# Patient Record
Sex: FEMALE | Race: WHITE | NOT HISPANIC OR LATINO | URBAN - METROPOLITAN AREA
[De-identification: names, ages, dates, MRNs, and addresses within clinical notes are randomized per-mention and may not be internally consistent; named-entity substitution may affect disease eponyms.]

---

## 2019-07-22 ENCOUNTER — TELEPHONE (OUTPATIENT)
Dept: NEUROLOGY | Facility: CLINIC | Age: 39
End: 2019-07-22

## 2019-07-22 DIAGNOSIS — Z82.49 FAMILY HISTORY OF CEREBRAL ANEURYSM: Primary | ICD-10-CM

## 2019-07-22 NOTE — TELEPHONE ENCOUNTER
Spoke with patients sister, Gabby, who reports patient would like to have imaging to screen for aneurysm as her brother (Dr. Hussein's patient) has incidental giant PCom aneurysm, and patients grandparents both  of ruptured aneurysm.     Will get orders from Dr. Hussein and have scheduling reach out to arrange. Gabby verbalized understanding.

## 2019-07-24 NOTE — TELEPHONE ENCOUNTER
Order harry Pierre informed. She will call to schedule and call RN back after completed. Will have Dr. Hussein review and follow-up via phone.

## 2019-08-02 ENCOUNTER — ANCILLARY PROCEDURE (OUTPATIENT)
Dept: MRI IMAGING | Facility: CLINIC | Age: 39
End: 2019-08-02
Attending: NEUROLOGICAL SURGERY
Payer: COMMERCIAL

## 2019-08-02 DIAGNOSIS — Z82.49 FAMILY HISTORY OF CEREBRAL ANEURYSM: ICD-10-CM

## 2019-08-20 ENCOUNTER — TELEPHONE (OUTPATIENT)
Dept: NEUROLOGY | Facility: CLINIC | Age: 39
End: 2019-08-20

## 2019-08-20 NOTE — TELEPHONE ENCOUNTER
MRA Brain 8/2/19 completed for family history    Impression:    1. No definite aneurysm or stenosis of the major intracranial  Arteries.    Sister, Gabby, informed. Will have Dr. Hussein review as well.     Message sent to Dr. Hussein to review. Tali Patterson RN 8/26/2019 10:21 AM

## 2019-08-28 NOTE — TELEPHONE ENCOUNTER
"Per Dr. Hussein - \"I agree with the report, no aneurysms. If she has low risk factors (no smoking, hypertension is controlled, etc), I would repeat an MRA in 10 years. If she's got risk factors and isn't going to control them, then MRA in 5 years.\"    Spoke with patients sister, Gabby. Patient is an active smoker - she just started again. No hypertension, DM, hyperlipidemia,etc that she is aware of. Advised smoking cessation and follow-up with primary care provider. Will contact patient in 5 years to repeat scan. Gabby verbalized understanding.       "

## 2023-04-04 ENCOUNTER — TRANSCRIBE ORDERS (OUTPATIENT)
Dept: OTHER | Age: 43
End: 2023-04-04

## 2023-04-04 DIAGNOSIS — L30.9 HAND DERMATITIS: Primary | ICD-10-CM

## 2023-07-28 NOTE — TELEPHONE ENCOUNTER
FUTURE VISIT INFORMATION      FUTURE VISIT INFORMATION:  Date: 10.20.23  Time: 10:30  Location: [facility] [place]  REFERRAL INFORMATION:  Referring provider:  Eliecer  Referring providers clinic:  [facility]  Reason for visit/diagnosis  Hand dermatitis [L30.9]/ referred by Dr Andrew Gonzáles/ recs in Epic/ appt sched per pt      RECORDS REQUESTED FROM:       Clinic name Comments Records Status Imaging Status   [facility] 3.31.23  Eliecer LORENZO    [facility] 12.21.22  Dwayne CE

## 2023-10-20 ENCOUNTER — PRE VISIT (OUTPATIENT)
Dept: ALLERGY | Facility: CLINIC | Age: 43
End: 2023-10-20

## 2023-10-31 ENCOUNTER — TELEPHONE (OUTPATIENT)
Dept: ALLERGY | Facility: CLINIC | Age: 43
End: 2023-10-31
Payer: COMMERCIAL

## 2023-10-31 NOTE — TELEPHONE ENCOUNTER
Jairon rodriguez mychart msg for patient to scheduled the following:    Appointment type: New  Provider: Dr. Lindo  Return date: 1st Available  Specialty phone number: [phone]

## 2023-12-03 ENCOUNTER — HEALTH MAINTENANCE LETTER (OUTPATIENT)
Age: 43
End: 2023-12-03

## 2024-07-01 ENCOUNTER — TELEPHONE (OUTPATIENT)
Dept: NEUROSURGERY | Facility: CLINIC | Age: 44
End: 2024-07-01

## 2024-07-01 DIAGNOSIS — I67.1 NONRUPTURED CEREBRAL ANEURYSM: Primary | ICD-10-CM

## 2024-07-01 NOTE — TELEPHONE ENCOUNTER
Called patient. She doesn't have insurance and wants a call back in Dec. 2024 to scheduled appointments w/ Dr. Hussein and MRA via task. -KB

## 2025-03-10 ENCOUNTER — TELEPHONE (OUTPATIENT)
Dept: NEUROSURGERY | Facility: CLINIC | Age: 45
End: 2025-03-10

## 2025-03-10 NOTE — TELEPHONE ENCOUNTER
Called patient to check and see if she has insurance and to schedule the MRA per Dr. Hussein. She does. She'll call her insurance company to find out if the MRA is covered. Then call the imaging department to schedule. Per Tali Patterson via task. -KB

## 2025-03-18 ENCOUNTER — TELEPHONE (OUTPATIENT)
Dept: NEUROSURGERY | Facility: CLINIC | Age: 45
End: 2025-03-18

## 2025-03-18 NOTE — TELEPHONE ENCOUNTER
Called patient to see if she reached out to her insurance. She said that she'd do it tomorrow. Then, she'll call back to schedule appointments. Via task. -KB